# Patient Record
Sex: FEMALE | NOT HISPANIC OR LATINO | URBAN - METROPOLITAN AREA
[De-identification: names, ages, dates, MRNs, and addresses within clinical notes are randomized per-mention and may not be internally consistent; named-entity substitution may affect disease eponyms.]

---

## 2017-04-04 ENCOUNTER — IMPORTED ENCOUNTER (OUTPATIENT)
Dept: URBAN - METROPOLITAN AREA CLINIC 1 | Facility: CLINIC | Age: 67
End: 2017-04-04

## 2017-04-04 PROBLEM — H10.45: Noted: 2017-04-04

## 2017-04-04 PROBLEM — H25.813: Noted: 2017-04-04

## 2017-04-04 PROBLEM — H16.143: Noted: 2017-04-04

## 2017-04-04 PROBLEM — H04.123: Noted: 2017-04-04

## 2017-04-04 PROCEDURE — 92015 DETERMINE REFRACTIVE STATE: CPT

## 2017-04-04 PROCEDURE — 92014 COMPRE OPH EXAM EST PT 1/>: CPT

## 2018-05-07 ENCOUNTER — IMPORTED ENCOUNTER (OUTPATIENT)
Dept: URBAN - METROPOLITAN AREA CLINIC 1 | Facility: CLINIC | Age: 68
End: 2018-05-07

## 2018-05-07 PROBLEM — H16.143: Noted: 2018-05-07

## 2018-05-07 PROBLEM — H25.813: Noted: 2018-05-07

## 2018-05-07 PROBLEM — H04.123: Noted: 2018-05-07

## 2018-05-07 PROCEDURE — 92014 COMPRE OPH EXAM EST PT 1/>: CPT

## 2018-05-07 PROCEDURE — 92015 DETERMINE REFRACTIVE STATE: CPT

## 2018-05-07 NOTE — PATIENT DISCUSSION
1.  Cataract OU: Visually Significant secondary to glare discussed the risks benefits alternatives and limitations of cataract surgery. The patient stated a full understanding and a desire to proceed with the procedure. The patient will need to return for preop appointment with cataract measurements and to have any additional questions answered and start pre-operative eye drops as directed. *Ok for pt to call if/when desires to schedule sx. Otherwise follow-up in 6  months for a dfe2. СЕРГЕЙ w/ PEK OU- The increase of artificial tears OU to QID were recommended. Continue Tear KIRAN OU QHS. Cauterized LLs OU. Consider Restasis/Xiidra if no improvement. 3. Return for an appointment for a dfe/glare in 6 months with Dr. Felecia Holloway.

## 2019-01-03 ENCOUNTER — IMPORTED ENCOUNTER (OUTPATIENT)
Dept: URBAN - METROPOLITAN AREA CLINIC 1 | Facility: CLINIC | Age: 69
End: 2019-01-03

## 2019-01-03 PROBLEM — H25.813: Noted: 2019-01-03

## 2019-01-03 PROBLEM — H04.123: Noted: 2019-01-03

## 2019-01-03 PROBLEM — H35.371: Noted: 2019-01-03

## 2019-01-03 PROBLEM — H16.143: Noted: 2019-01-03

## 2019-01-03 PROCEDURE — 92012 INTRM OPH EXAM EST PATIENT: CPT

## 2019-01-03 NOTE — PATIENT DISCUSSION
1.  Cataract OU: Visually Significant secondary to glare discussed the risks benefits alternatives and limitations of cataract surgery. The patient stated a full understanding and a desire to proceed with the procedure. The patient will need to return for preop appointment with cataract measurements and to have any additional questions answered and start pre-operative eye drops as directed. ***Not MF candidateSchedule phaco PCL OS then ODOtherwise follow-up in 30/glare in 6 months2. СЕРГЕЙ w/ PEK OU- Stable. Cauterized LLs OU. The continuation of artificial tears were recommended. Continue Tear KIRAN OU QHS. 3. Epiretinal Membrane OD- Appears benign. Condition discussed w/ pt. Observe for change. 4. Return for an appointment for ascan H&P/ MRX with Dr. Miya Huff.

## 2019-01-22 ENCOUNTER — IMPORTED ENCOUNTER (OUTPATIENT)
Dept: URBAN - METROPOLITAN AREA CLINIC 1 | Facility: CLINIC | Age: 69
End: 2019-01-22

## 2019-01-22 PROBLEM — H25.813: Noted: 2019-01-22

## 2019-01-22 PROCEDURE — 92136 OPHTHALMIC BIOMETRY: CPT

## 2019-01-22 NOTE — PATIENT DISCUSSION
1. Cataract OU:  Visually Significant secondary to glare discussed the risks benefits alternatives and limitations of cataract surgery. The patient stated a full understanding and a desire to proceed with the procedure. Pt understands they will need glasses post-op to achieve their best corrected vision. Recommended Toric patient deferred wants what insurance covers. Phaco PCL OS then OD. Return for an appointment for Return as scheduled with Dr. Iesha Young.

## 2019-01-30 ENCOUNTER — IMPORTED ENCOUNTER (OUTPATIENT)
Dept: URBAN - METROPOLITAN AREA CLINIC 1 | Facility: CLINIC | Age: 69
End: 2019-01-30

## 2019-01-30 PROBLEM — H25.812 COMBINED FORM OF SENILE CATARACT OF LEFT EYE: Status: ACTIVE | Noted: 2019-01-30

## 2019-01-30 PROBLEM — H25.812 COMBINED FORM OF SENILE CATARACT OF LEFT EYE: Status: RESOLVED | Noted: 2019-01-30 | Resolved: 2019-01-30

## 2019-01-31 ENCOUNTER — IMPORTED ENCOUNTER (OUTPATIENT)
Dept: URBAN - METROPOLITAN AREA CLINIC 1 | Facility: CLINIC | Age: 69
End: 2019-01-31

## 2019-01-31 PROBLEM — Z96.1: Noted: 2019-01-31

## 2019-01-31 PROCEDURE — 99024 POSTOP FOLLOW-UP VISIT: CPT

## 2019-01-31 NOTE — PATIENT DISCUSSION
POD#1 CE/IOL OS (Standard) -- Doing well. Continue all 3 gtts as prescribed and until gone. Prednisolone BID OS Acular Qdaily OS Ocuflox TID OS Post op Warnings Reiterated RTC as scheduled on 2/5/19

## 2019-02-05 ENCOUNTER — IMPORTED ENCOUNTER (OUTPATIENT)
Dept: URBAN - METROPOLITAN AREA CLINIC 1 | Facility: CLINIC | Age: 69
End: 2019-02-05

## 2019-02-05 PROBLEM — H25.811: Noted: 2019-02-05

## 2019-02-05 PROCEDURE — 92136 OPHTHALMIC BIOMETRY: CPT

## 2019-02-20 ENCOUNTER — IMPORTED ENCOUNTER (OUTPATIENT)
Dept: URBAN - METROPOLITAN AREA CLINIC 1 | Facility: CLINIC | Age: 69
End: 2019-02-20

## 2019-02-20 PROBLEM — H25.811 COMBINED FORM OF SENILE CATARACT OF RIGHT EYE: Status: RESOLVED | Noted: 2019-02-20 | Resolved: 2019-02-20

## 2019-02-20 PROBLEM — H25.811 COMBINED FORM OF SENILE CATARACT OF RIGHT EYE: Status: ACTIVE | Noted: 2019-02-20

## 2019-02-21 ENCOUNTER — IMPORTED ENCOUNTER (OUTPATIENT)
Dept: URBAN - METROPOLITAN AREA CLINIC 1 | Facility: CLINIC | Age: 69
End: 2019-02-21

## 2019-02-21 PROBLEM — Z96.1: Noted: 2019-02-21

## 2019-02-21 PROCEDURE — 99024 POSTOP FOLLOW-UP VISIT: CPT

## 2019-02-21 NOTE — PATIENT DISCUSSION
1. POD#1 Phaco/ PCL OD (Standard) - doing well. Continue all 3 gtts as prescribed and until gone. Use Prednisolone BID OD Acular Qdaily OD Ocuflox TID OD Post op Warnings Reiterated 2. POW#2 Phaco/ PCL OS (Standard) - doing well Continue all 3 gtts as prescribed and until gone.  Use Prednisolone BID OS till out Use Acular Qdaily OS till out Post op Warnings Reiterated RTC as scheduled

## 2019-03-14 ENCOUNTER — IMPORTED ENCOUNTER (OUTPATIENT)
Dept: URBAN - METROPOLITAN AREA CLINIC 1 | Facility: CLINIC | Age: 69
End: 2019-03-14

## 2019-03-14 PROBLEM — Z09: Noted: 2019-03-14

## 2019-03-14 PROCEDURE — 99024 POSTOP FOLLOW-UP VISIT: CPT

## 2019-03-14 NOTE — PATIENT DISCUSSION
POW#3 Phaco/ PCL OU (Standard OU) -- Doing well. Finish PO meds per schedule MRX for glasses givenReturn for an appointment in 6 MO for a 30 OU with Dr. Ryan Pascual.

## 2019-08-09 ENCOUNTER — HOSPITAL ENCOUNTER (OUTPATIENT)
Age: 69
Setting detail: OUTPATIENT SURGERY
Discharge: HOME | End: 2019-08-09
Attending: INTERNAL MEDICINE | Admitting: INTERNAL MEDICINE
Payer: MEDICARE

## 2019-08-09 VITALS
BODY MASS INDEX: 21.32 KG/M2 | HEIGHT: 61 IN | OXYGEN SATURATION: 98 % | HEART RATE: 86 BPM | TEMPERATURE: 97.2 F | WEIGHT: 112.9 LBS | SYSTOLIC BLOOD PRESSURE: 94 MMHG | RESPIRATION RATE: 18 BRPM | DIASTOLIC BLOOD PRESSURE: 51 MMHG

## 2019-08-09 PROBLEM — K50.112 CROHN'S COLITIS, WITH INTESTINAL OBSTRUCTION (HCC): Status: ACTIVE | Noted: 2019-08-09

## 2019-08-09 PROCEDURE — 74011250636 HC RX REV CODE- 250/636: Performed by: INTERNAL MEDICINE

## 2019-08-09 PROCEDURE — G0500 MOD SEDAT ENDO SERVICE >5YRS: HCPCS

## 2019-08-09 PROCEDURE — 74011250636 HC RX REV CODE- 250/636

## 2019-08-09 PROCEDURE — 76040000019: Performed by: INTERNAL MEDICINE

## 2019-08-09 RX ORDER — MIDAZOLAM HYDROCHLORIDE 1 MG/ML
INJECTION, SOLUTION INTRAMUSCULAR; INTRAVENOUS AS NEEDED
Status: DISCONTINUED | OUTPATIENT
Start: 2019-08-09 | End: 2019-08-09 | Stop reason: HOSPADM

## 2019-08-09 RX ORDER — FENTANYL CITRATE 50 UG/ML
50-200 INJECTION, SOLUTION INTRAMUSCULAR; INTRAVENOUS
Status: CANCELLED | OUTPATIENT
Start: 2019-08-09

## 2019-08-09 RX ORDER — SODIUM CHLORIDE, SODIUM LACTATE, POTASSIUM CHLORIDE, CALCIUM CHLORIDE 600; 310; 30; 20 MG/100ML; MG/100ML; MG/100ML; MG/100ML
INJECTION, SOLUTION INTRAVENOUS
Status: DISCONTINUED | OUTPATIENT
Start: 2019-08-09 | End: 2019-08-09 | Stop reason: HOSPADM

## 2019-08-09 RX ORDER — MIDAZOLAM HYDROCHLORIDE 1 MG/ML
.5-5 INJECTION, SOLUTION INTRAMUSCULAR; INTRAVENOUS
Status: DISCONTINUED | OUTPATIENT
Start: 2019-08-09 | End: 2019-08-09 | Stop reason: HOSPADM

## 2019-08-09 RX ORDER — MIDAZOLAM HYDROCHLORIDE 1 MG/ML
INJECTION, SOLUTION INTRAMUSCULAR; INTRAVENOUS
Status: DISCONTINUED
Start: 2019-08-09 | End: 2019-08-09 | Stop reason: HOSPADM

## 2019-08-09 RX ORDER — SODIUM CHLORIDE 9 MG/ML
100 INJECTION, SOLUTION INTRAVENOUS CONTINUOUS
Status: DISCONTINUED | OUTPATIENT
Start: 2019-08-09 | End: 2019-08-09 | Stop reason: HOSPADM

## 2019-08-09 RX ORDER — DEXTROMETHORPHAN/PSEUDOEPHED 2.5-7.5/.8
40 DROPS ORAL ONCE
Status: DISCONTINUED | OUTPATIENT
Start: 2019-08-09 | End: 2019-08-09 | Stop reason: HOSPADM

## 2019-08-09 NOTE — H&P
Reason for Appointment   1. F/U   2. Crohn's disease   3. Anemia     History of Present Illness   General:   The patient is here for a follow-up visit regarding anastomotic Crohn's disease in the setting of subtotal colectomy with ileal rectal anastomosis. I saw her last in November 2018. She reported almost immediate improvement in her feeling of fullness after dilatation of her anastomosis in March 2018. The plan was to repeat a sigmoidoscopy 6-12 months after starting a higher dose or Remicade to assess ulcer healing. She was preoccupied with taking care of her elderly mother who was suffering from dementia and wanted to postpone that examination. I suggested continuing Remicade 10 mg/kg IV every 8 weeks, checking her hematologic parameters which showed a ferritin low-normal at 20 until it was normal. B12 level was normal, folic acid, RBC level was normal. I will see her back in the office in 6 months. She is here for that routine visit today. May 16, 2019: The patient's bowel function is at baseline. She is taking Imodium on a regular basis but I also noticed that she is a probiotic as one of her regular medications. She denies chronic or recent abdominal pain or discomfort similar to that she's experienced with stricturing of her ileocolonic anastomosis in the past. We discussed the issue of iron   Deficiency and the fact that I have placed her on oral iron. She is to continue taking the oral iron until serum ferritin is greater than 100. All questions were answered. She is not taking NSAIDs on a regular basis. She has no visible bleeding. Her appetite is good. Her weight is stable. She has no acid reflux symptoms, chronic or recent abdominal pain, nausea or vomiting. We discussed my plan to repeat sigmoidoscopy and assess ulceration at her ileoanal anastomosis on the higher dose of Remicade. They change in biologic therapy might be necessary depending on the endoscopic findings. All questions were answered. Current Medications   Taking    Loperamide Hydrochloride 2mg caps 4 po QAM and 3 po QPM daily PO bid    Remicade Infusion 10 mg/kg intravenous solution 10 mg/ kg IV every 8 weeks    Metamucil 8 tsp. bid    multivitamin 1 tab q. d.    Glucosamine w/ Chondroitin 750/600 1 tab qd    Butalbital Compound 325 mg-50 mg-40 mg tablet 2 tabs orally q. 6h p.r.n. Vitamin C 1000 mg tablet 2 tab(s) orally once a day    Citracal    Vitamin D3 1000 intl units tablet 4 tabs orally once a day    Probiotic 1tab qd    Imodium 2 mg tabs 3 tabs in am; 3 tabs in pm oral bid oral bid    Not-Taking/PRN    Ambien 5 mg tablet 1 tab(s) orally once a day (at bedtime)    Norco 325 mg-10 mg tablet 1 tab(s) orally every 6 hours    Discontinued    estradiol 0.5 mg tablet 1 tab(s) orally 5 days a week    Benadryl 50 mg/mL solution 25 mg intravenously or IM once, 30-60 mins before FRemicade INfusion    Bactrim 400 mg-80 mg tablet 2 tab(s) orally 2 times a day    hydralazine 25 mg tablet 1 tab(s) orally 4 times a day    Sherman Forte Vitamin L88 with C, Folic Acid and Iron capsule 1 cap(s) orally once a day    Remicade - Induction 10 mg/kg powder for reconsitution week 0,2,6, then every 8 weeks IV infusion    Ambien 1 tab p.r.n. Humira Pen 40 mg/1.6 mL kit 40 mg subcutaneously every 2 weeks    Imodium 2 mg tabs 4 tabs in am; 3 tabs in pm oral bid oral bid    Probiotic 1tab qd    Vitamin B complex 1 tab qd    Zinc    Medication List reviewed and reconciled with the patient      Past Medical History   Crohn's ileocolitis status post subtotal colectomy with ileorectal anastomosis many years ago. TMJ.        Surgical History   Colonoscopy    Duodenal fistula repair    Bartholans cyst    Hysterectomy    Bladder repair    Abdominal scar revision    Rectal prolapse repair    Flexible sigmoidoscopy 2017   Squamous cell carcinoma removal      Family History   Father: , heart attack, diagnosed with HEART DISEASE NOS   Mother: alive, Hypertension, [address] [facility] N[facility]   No family history of GI cancers. 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 Smoking   Tobacco use: never smoker  Patient uses other tobacco products: No  e-Cigarettes No  no Alcohol, The patient does not drink. no IV Drug use. Blood transfusions: yes, 1970's. Marital Status: . 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 She has been a special  for the last 12 years. Allergies   Phenobarbital: rash   penicillin: rash   Bactrim DS: rash      Review of Systems   Complete review of systems was taken and reviewed with the patient on 5/16/19 and will be scanned into the medical record. Positives will be noted in HPI. Negatives will not be listed here. Vital Signs   BMI 21.40, HR 87, /70, Wt 117, Ht 5'2'', RR 16.     Physical Examination   General: Pleasant, healthy-appearing female in no obvious distress. Abdomen: Soft, nontender. No organomegaly or mass palpable. Bowel sounds are normal there no bruits. Extremities: No pedal edema. Assessments     1. Crohn's disease of both small and large intestine with unspecified complications - T94.464 (Primary), The patient has recurrent disease at her ileorectal anastomosis based on previous endoscopic evaluation. She has required dilatation of the anastomosis on various occasions in the past for symptoms of abdominal pain/cramps similar to what she is experiencing recently. Her last dilatation was in December 2015 using a 61 Occitan dilator over a wire until March 2018. She has been on Remicade for several years. 2. Iron deficiency anemia, unspecified - D50.9, Iron deficiency anemia may be secondary to occult bleeding from ulceration at her anastomosis secondary to active Crohn's disease.  She was placed on iron following her last office visit and will need to have her iron studies repeated to verify ferritin is greater than 100 and hemoglobin normal in order to discontinue iron. We'll also need repeat endoscopic evaluation to verify that ulcers have healed so that iron deficiency does not recur. Treatment   1. Crohn's disease of both small and large intestine with unspecified complications   Refill Remicade Infusion intravenous solution, 10 mg/kg, 10 mg/ kg, IV, every 8 weeks, 90 days, 6 Vial, Refills 6  Refill Imodium tabs, 2 mg, 3 tabs in am; 3 tabs in pm oral bid, oral, bid, 90 days, 540, Refills 0  IMAGING: Flexible Sigmoidoscopy (Ordered for 06/04/2019)  Notes: 1. Continue Remicade infusions 10 mg/kg IV every 8 weeks. 2. Sigmoidoscopy will be scheduled sometime in the next month or so at the patient's convenience to assess ulceration at her anastomosis and decide if further adjustment Remicade dose is necessary and/or she needs be switched to an alternate biologic agent. 7:45 AM on a day that I am doing office procedures. 3. She is to let me know if and when symptoms of stenosis of her anastomosis redevelop in terms of bloating, difficulty feeling empty after defecation, in which case repeat dilatation will be necessary. 2. Iron deficiency anemia, unspecified   Start Sherman Forte capsule, Vitamin X93 with C, Folic Acid and Iron, 1 cap(s), orally, once a day, 90 day(s), 90, Refills 2  LAB: ferritin  LAB: Iron, Serum  LAB: TIBC  LAB: CBC, Platelet, No Differential  Notes: 1. Continue to avoid all NSAIDs such as ibuprofen, Advil, Aleve, Motrin, etc. 2. CBC, iron studies today. Call me next week for results with a decision as to whether or not iron can be discontinued. 3. Continue Sherman Forte 1/day until serum ferritin is greater than 100. She can cut the tablets in half if necessary to make them easier to swallow.              Aug 9, 2019:  PATIENT REPORTS ABDOMINAL BLOATING, INCREASED CONSTIPATION FOR THE LAST WEEK  AND ABDOMINAL DISTENSION SIMILAR TO SYMPTOMS THAT SHE HAS EXPERIENCED WITH ANASTIOMOTIC STRICTURE/TRANSIENT OBSTRUCTION IN THE PAST.  sHE HAS ACHIEVED PARTIAL IMPROVEMENT IN SYMPTOMS AFTER CITRATE OF MAGNESIA AND 2 ENEMAS. HER BLOATING HAS RESOLVED AS HAS THE NAUSEA. SHE HAS NO ABDOMINAL PAIN OTHER THAN MILD PERSISTENT BLOATING. Chest:  Clear, good air movement          COR  S1, S2 normal, no M,R,G          ABDOMEN:  SOFT, MILD DISCOMFORT TO DEEP PALPATION.  BS NORMAL, NO INCREASED TYMPANY TO PERCUSSION       IMP:  TRANSIENT COLON OBSTRUCTION AT COLO-COLONIC ANASTOMOSIS---RELIEVED WITH LAXATIVES AND ENEMAS     PLAN: 1 FLEX SIG WITH ANASTOMOTIC DILATATION AS PREVOUSLY HAS WORKED WELL                  2.  WILL NEED A CHANGE IN BIOLOGIC THERAPY TO BETTER CONTROL ONGOING INFLAMMATION OF ANASTOMOSIS AND PREVENT RECURRENT STRICTURING

## 2019-08-09 NOTE — PROCEDURES
Flexible Sigmoid Procedure Note    Post-operative Diagnosis/Impression: -stenosis and active ulceration of the colo-colonic anastomosis    Recommendations: 1. Continue Entyvio induction and then start maintenance infusions to heal active Crohn's ulceration and prevent recurrent stricture/stensis and the need for future dilatation                                     2. She may need to reconsider ileostomy depending on clinical course                                     3. Resume usual  diet and bowel regimine                                     4.  Call me Monday with symptom update    Procedure Date:  August 9, 2019  Procedure:  Flexible Sigmoid. Attending Physician:  Sunni Soto MD  Non-physician Assistants:  @[email or web address][[id number]:last@    Informed Consent:  The risks, benefits and alternatives of the procedure and the sedation options were discussed with the patient. The patient is aware of potential complications including but not limited to bleeding, perforation, iv sedation, missed polyp. Informed consent is documented in the medical record. Pre-Procedure Assessment:  A current history and physical is on the chart. Patient's medication allergies were reviewed. Sedation:  Versed 2 mg IV and Fentanyl 100 mcg IV    Procedure Details: The patient was monitored continuously with ECG tracing, pulse oximetry, blood pressure monitoring and direct observations. A rectal examination was performed. The Olympus  endoscope was inserted into the rectum and advanced under direct vision across the colo-colonic anastomosis and 10 cm into the small bowel above. A careful inspection was made as the endoscope was withdrawn. A retroflexion was performed and distal rectum imaged. The endoscope. The endoscope placed back up into the small bowel and a Savary guidewire tip was placed in the small bowel above the anastomosis.  The scope was removed over the guidewire and a single 60 Singaporean savary dilator was passed without immediate complication noted or resistance felt    Findings:    1. Ulcerated, stenotic colo-colonic anastomosis related to active Crohn's disease--dilated as above b/o recent symptoms of partial colonic obstruction         Estimated Blood Loss: minimal  Complications: none  Specimens: none    Nkechi Wood.  MD Tim, Chuck Wharton, AGAF

## 2019-08-09 NOTE — DISCHARGE INSTRUCTIONS
Recommendations: 1. Continue Entyvio induction and then start maintenance infusions to heal active Crohn's ulceration and prevent recurrent stricture/stensis and the                                               need for future dilatation                                     2. She may need to reconsider ileostomy depending on clinical course                                     3. Resume usual  diet and bowel regimine                                     4.  Call me Monday with symptom update      DISCHARGE SUMMARY from Nurse    PATIENT INSTRUCTIONS:    After general anesthesia or intravenous sedation, for 24 hours or while taking prescription Narcotics:  · Limit your activities  · Do not drive and operate hazardous machinery  · Do not make important personal or business decisions  · Do  not drink alcoholic beverages  · If you have not urinated within 8 hours after discharge, please contact your surgeon on call. Report the following to your surgeon:  · Excessive pain, swelling, redness or odor of or around the surgical area  · Temperature over 100.5  · Nausea and vomiting lasting longer than 4 hours or if unable to take medications  · Any signs of decreased circulation or nerve impairment to extremity: change in color, persistent  numbness, tingling, coldness or increase pain  · Any questions    These are general instructions for a healthy lifestyle:    No smoking/ No tobacco products/ Avoid exposure to second hand smoke  Surgeon General's Warning:  Quitting smoking now greatly reduces serious risk to your health.     Obesity, smoking, and sedentary lifestyle greatly increases your risk for illness    A healthy diet, regular physical exercise & weight monitoring are important for maintaining a healthy lifestyle    You may be retaining fluid if you have a history of heart failure or if you experience any of the following symptoms:  Weight gain of 3 pounds or more overnight or 5 pounds in a week, increased swelling in our hands or feet or shortness of breath while lying flat in bed. Please call your doctor as soon as you notice any of these symptoms; do not wait until your next office visit. Patient armband removed and given to patient to take home. Patient was informed of the privacy risks if armband lost or stolen      The discharge information has been reviewed with the patient. The patient verbalized understanding. Discharge medications reviewed with the patient and appropriate educational materials and side effects teaching were provided.   ___________________________________________________________________________________________________________________________________

## 2019-09-24 ENCOUNTER — IMPORTED ENCOUNTER (OUTPATIENT)
Dept: URBAN - METROPOLITAN AREA CLINIC 1 | Facility: CLINIC | Age: 69
End: 2019-09-24

## 2019-09-24 PROBLEM — H26.493: Noted: 2019-09-24

## 2019-09-24 PROBLEM — H04.123: Noted: 2019-09-24

## 2019-09-24 PROBLEM — H16.143: Noted: 2019-09-24

## 2019-09-24 PROBLEM — Z96.1: Noted: 2019-09-24

## 2019-09-24 PROBLEM — H35.371: Noted: 2019-09-24

## 2019-09-24 PROCEDURE — 83861 MICROFLUID ANALY TEARS: CPT

## 2019-09-24 PROCEDURE — 92014 COMPRE OPH EXAM EST PT 1/>: CPT

## 2019-09-24 NOTE — PATIENT DISCUSSION
1.  СЕРГЕЙ w/ PEK OU- H/o Cautery RLL LLL. Tear La 311/310. Patient remains symptomatic despite routine frequent tear use & previous cautery LLs OU. Switch to PF ATs and increase frqeuency to QID OU Routinely (Sample of Soothe XP PF given). Begin Restasis BID OU (Rx sent to patient's pharm). Will recheck in 3 mo check K on Restasis. 2.  Epiretinal Membrane OD - Observe for change. 3. Pseudophakia OU - (Standard OU) 4. PCO OU- Observe. Letter to PCP MRx deferredReturn for an appointment in 3 mo 10 (Check K on Restasis) with Dr. Bubba Oneal.

## 2020-07-01 ENCOUNTER — IMPORTED ENCOUNTER (OUTPATIENT)
Dept: URBAN - METROPOLITAN AREA CLINIC 1 | Facility: CLINIC | Age: 70
End: 2020-07-01

## 2020-07-01 PROBLEM — H35.371: Noted: 2020-07-01

## 2020-07-01 PROBLEM — H16.143: Noted: 2020-07-01

## 2020-07-01 PROBLEM — H26.493: Noted: 2020-07-01

## 2020-07-01 PROBLEM — H04.123: Noted: 2020-07-01

## 2020-07-01 PROBLEM — H00.15: Noted: 2020-07-01

## 2020-07-01 PROCEDURE — 92012 INTRM OPH EXAM EST PATIENT: CPT

## 2020-07-01 NOTE — PATIENT DISCUSSION
1.  Chalazion left lower eyelid -- Begin Doxy PO 50mg BID x 14days #28 (erx'd). Use hot compresses TID x 5 minutes. If without improvement discussed with patient possible Incision and Drainage procedure. Risks and benefits discussed with patient and patient states full understanding. Recommend therapearl mask. 2.  Epiretinal Membrane OD w/ ? ARMD OD -- Progression noted today w/ significant drop in vision. Will refer patient to [facility] for further evaluation. 3.  PCO OU -- Observe4. СЕРГЕЙ w/ PEK OU -- H/o Cautery RLL LLL. Cont Restasis BID OU. Cont ATs QID OU. 5.  Pseudophakia OU -- (Standard OU) Return for an appointment in 2 week for a 10/poss I&D with Dr. Kena Ames.

## 2020-07-14 ENCOUNTER — IMPORTED ENCOUNTER (OUTPATIENT)
Dept: URBAN - METROPOLITAN AREA CLINIC 1 | Facility: CLINIC | Age: 70
End: 2020-07-14

## 2020-07-14 PROBLEM — H00.15: Noted: 2020-07-14

## 2020-07-14 PROBLEM — Z96.1: Noted: 2020-07-14

## 2020-07-14 PROBLEM — H16.143: Noted: 2020-07-14

## 2020-07-14 PROBLEM — H26.493: Noted: 2020-07-14

## 2020-07-14 PROBLEM — H04.123: Noted: 2020-07-14

## 2020-07-14 PROCEDURE — 99213 OFFICE O/P EST LOW 20 MIN: CPT

## 2020-07-14 NOTE — PATIENT DISCUSSION
1.  Chalazion left lower eyelid - Cont Doxycycline PO until gone. Hot compresses TID x 5 minutes for 3 weeks. If without improvement discussed with patient possible Incision and Drainage procedure. Risks and benefits discussed with patient and patient states full understanding. 2. СЕРГЕЙ w/ PEK OU- (LL cauterized OU) Cont Restasis BID OU. Cont ATs QID OU. 3.  PCO OU: (Posterior Capsule Opacification)   Observe and consider yag cap when pt feels pco visually significant and visual acuity decreases to appropriate level. 4. Pseudophakia OU - (Standard OU) 5. H/o ERM OD - Follow up as scheduled with Dr. Jessie Morrison for ERM Peel OD 6. H/o BRVO w/ Macular Edema OD - Followed by Dr. Jessie Morrison. H/o injections ODReturn for an appointment in 6 months 30/glare with Dr. Caleb Hunter.

## 2021-09-16 PROBLEM — R10.9 ABDOMINAL PAIN: Status: ACTIVE | Noted: 2021-09-16

## 2021-09-16 PROBLEM — R11.2 NAUSEA & VOMITING: Status: ACTIVE | Noted: 2021-09-16

## 2021-09-16 PROBLEM — K56.609 SMALL BOWEL OBSTRUCTION (HCC): Status: ACTIVE | Noted: 2021-09-16

## 2021-09-16 PROBLEM — K50.812 CROHN'S DISEASE OF BOTH SMALL AND LARGE INTESTINE WITH INTESTINAL OBSTRUCTION (HCC): Status: ACTIVE | Noted: 2021-09-16

## 2021-09-16 PROBLEM — E86.0 DEHYDRATION: Status: ACTIVE | Noted: 2021-09-16

## 2021-09-16 PROBLEM — K91.89: Status: ACTIVE | Noted: 2021-09-16

## 2021-11-22 NOTE — PATIENT DISCUSSION
Epiretinal Membrane OD - Observe for change. OK I CALLED PATIENT AGAIN AND CORRECTED MY ORIGINAL MESSAGE TO HER ALONG WITH TIME AND NOTED THAT THE PREVIOUS MESSAGE WAS NOT ACCURATE! I WAS VERY CLEAR WITH MY ERROR AND ADVISED HER TO CONTACT THE OFFICE IF SHE DID NOT UNDERSTAND.

## 2022-03-18 PROBLEM — E86.0 DEHYDRATION: Status: ACTIVE | Noted: 2021-09-16

## 2022-03-18 PROBLEM — K50.812 CROHN'S DISEASE OF BOTH SMALL AND LARGE INTESTINE WITH INTESTINAL OBSTRUCTION (HCC): Status: ACTIVE | Noted: 2021-09-16

## 2022-03-19 PROBLEM — K50.112 CROHN'S COLITIS, WITH INTESTINAL OBSTRUCTION (HCC): Status: ACTIVE | Noted: 2019-08-09

## 2022-03-19 PROBLEM — K91.89: Status: ACTIVE | Noted: 2021-09-16

## 2022-03-19 PROBLEM — K56.609 SMALL BOWEL OBSTRUCTION (HCC): Status: ACTIVE | Noted: 2021-09-16

## 2022-03-19 PROBLEM — R11.2 NAUSEA & VOMITING: Status: ACTIVE | Noted: 2021-09-16

## 2022-03-20 PROBLEM — R10.9 ABDOMINAL PAIN: Status: ACTIVE | Noted: 2021-09-16

## 2022-04-02 ASSESSMENT — VISUAL ACUITY
OD_GLARE: 20/200
OS_SC: 20/25
OD_SC: 20/25
OS_CC: 20/20
OD_CC: J1
OS_SC: 20/30
OD_SC: 20/30
OD_CC: 20/60
OS_SC: 20/30
OS_CC: 20/20
OD_GLARE: 20/150
OS_CC: 20/25
OS_CC: 20/20
OS_CC: 20/20
OS_GLARE: 20/100
OS_CC: 20/30
OD_GLARE: 20/100
OS_GLARE: 20/200
OS_GLARE: 20/40
OS_CC: J1
OD_CC: 20/30+1
OS_CC: 20/20
OD_GLARE: 20/100
OD_GLARE: 20/200
OS_GLARE: 20/200
OD_SC: 20/30
OU_SC: J7
OS_GLARE: 20/100
OD_SC: 20/30
OD_CC: 20/80
OS_SC: 20/30
OD_CC: 20/60
OD_CC: 20/80

## 2022-04-02 ASSESSMENT — KERATOMETRY
OS_K2POWER_DIOPTERS: 44.75
OS_AXISANGLE_DEGREES: 170
OD_K2POWER_DIOPTERS: 44.50
OD_K1POWER_DIOPTERS: 43.25
OS_AXISANGLE2_DEGREES: 080
OD_AXISANGLE2_DEGREES: 075
OD_AXISANGLE_DEGREES: 165
OS_K1POWER_DIOPTERS: 44.25

## 2022-04-02 ASSESSMENT — TONOMETRY
OS_IOP_MMHG: 11
OD_IOP_MMHG: 12
OD_IOP_MMHG: 10
OD_IOP_MMHG: 9
OS_IOP_MMHG: 9
OS_IOP_MMHG: 9
OS_IOP_MMHG: 12
OS_IOP_MMHG: 10
OD_IOP_MMHG: 12
OS_IOP_MMHG: 10
OD_IOP_MMHG: 12
OD_IOP_MMHG: 10
OS_IOP_MMHG: 10
OD_IOP_MMHG: 10
OS_IOP_MMHG: 12
OS_IOP_MMHG: 11
OS_IOP_MMHG: 10
OD_IOP_MMHG: 9

## 2024-06-28 NOTE — PERIOP NOTES
No complaints today   Continue pantoprazole   Phase 2 Recovery Summary  Patient arrived to Phase 2 at 1426  Report received from [address]:    08/09/19 1332 08/09/19 1344 08/09/19 1427   BP: 111/63  94/51   Pulse: 76  86   Resp:  18 18   Temp: 97.2 °F (36.2 °C)     SpO2: 100%  98%   Weight: 51.2 kg (112 lb 14.4 oz)     Height: 5' 1\" (1.549 m)           A&O x4. No complains of pain, nausea, and dizziness. Family arrived with patient to recovery room. Patient tolerated PO fluids. Patient ambulated from bed to chair with minimal assistance. IV removed and patient allowed to get dressed. MD spoke to family and patient post procedure in recovery room. Reviewed d/c instructions with patient and family. Family signed for d/c. Patient transported to vehicle via wheelchair.        Patient discharged to home with family     Guido Wilhelm RN

## (undated) DEVICE — KIT COLON W/ 1.1OZ LUB AND 2 END